# Patient Record
(demographics unavailable — no encounter records)

---

## 2024-11-29 NOTE — HISTORY OF PRESENT ILLNESS
[FreeTextEntry1] : JERED DUNNE is a 78 year old woman who presents for follow up office visit for further evaluation of joint symptoms and rheumatic  diseases including osteoarthritis, osteoporosis, fibromyalgia, and Raynaud's.  Patient feels fairly well. Low back pain radiating to right lower extremity to the foot with paresthesias. Occasional Raynauds Episodes. Denies recent sleep disturbance and fatigue. Short course prednisone with relief of right elbow pains but no change of low back pain. Patient continues calcium and vitamin D supplements and annual IV zoledronate (last dose March 18, 2024).  Patient denies rash or side effects with their medications.  Patient is content with their current medications.

## 2024-11-29 NOTE — CONSULT LETTER
[Dear  ___] : Dear  [unfilled], [Consult Letter:] : I had the pleasure of evaluating your patient, [unfilled]. [Please see my note below.] : Please see my note below. [Consult Closing:] : Thank you very much for allowing me to participate in the care of this patient.  If you have any questions, please do not hesitate to contact me. [Sincerely,] : Sincerely, [FreeTextEntry3] : Myron Myron I. Kleiner, M.D., FACR Chief, Division of Rheumatology Department of Medicine [facility] [DrSienna  ___] : Dr. EUCEDA

## 2024-11-29 NOTE — CONSULT LETTER
[Dear  ___] : Dear  [unfilled], [Consult Letter:] : I had the pleasure of evaluating your patient, [unfilled]. [Please see my note below.] : Please see my note below. [Consult Closing:] : Thank you very much for allowing me to participate in the care of this patient.  If you have any questions, please do not hesitate to contact me. [Sincerely,] : Sincerely, [FreeTextEntry3] : Myron Myron I. Kleiner, M.D., FACR Chief, Division of Rheumatology Department of Medicine [facility] [DrSienna  ___] : Dr. EUCEDA

## 2024-11-29 NOTE — ASSESSMENT
[FreeTextEntry1] : Impression: JERED DUNNE is a 78 year old woman who presents for follow up office visit for further evaluation of joint symptoms and rheumatic diseases including osteoarthritis, fibromyalgia, Raynaud's, chronic low back pain/lumbar spinal stenosis/lumbar neuroforaminal stenosis, osteoporosis with compression deformity of L4  Patient feels fairly well. Low back pain radiating to right lower extremity to the foot with paresthesias Secondary to chronic low back pain/lumbar spinal stenosis/lumbar neuroforaminal stenosis and cyst pressing on the S2 nerve root. From previous physical exam tender b/l paraspinal muscles, On exam pt has bilateral bicipital tendonitis, right lateral epicondylitis, flexor tenosynovitis bilateral first-fifth fingers contributing to her joint pain. Occasional Raynauds Episodes - on exam pt has Raynauds episode - not bothersome. Denies recent sleep disturbance and fatigue with her fibromyalgia quiescent. Short course prednisone with relief of right elbow pains but no change of low back pain. On exam pt has dry eyes and tongue moist - I will continue to monitor for Sjogren Syndrome. Recent lab tests results revealed +anticardiolipin IgM antibody, otherwise unrevealing-- with extensive discussion. Recent xray results revealed osteoarthritis LS spine, compression deformity of L4 -- with extensive discussion. 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  Patient continues prophylactic aspirin for their positive anticardiolipin antibody-- patient aware of the increased risk of thrombosis. Patient continues calcium and vitamin D supplements and annual IV zoledronate (last dose March 18, 2024) without side effect or complications for osteoporosis with compression deformity of L4. Patient denies rash or side effects with their medications.  Patient is content with their current medications.   Plan: I reviewed  chart and previous records  I reviewed previous lab results with patient--with extensive discussion X-rays results reviewed with the patient with extensive discussion I reviewed recent MRI of LS spine results with patient with extensive discussion Laboratory tests ordered today-see list below--with coordination of care Continue other current medications (other than those changed below) IV zoledronate 5 mg (Possible side effects explained including AVN of jaw)--to be authorized and performed--with coordination of care with the authorization team and nursing staff - after March 21th, 2025  Consider short course of Medrol- - pt declined  Consider NSAIDs - pt declined  Consider Muscle relaxant - pt declined  Tylenol 1000 mg t.i.d. p.r.n. or Tylenol 1300 mg b.i.d. p.r.n. (possible side effects explained) Daily exercise starting at 10 minutes per day, gradually increasing to at least 30 minutes per day--emphasized  Keep hands and feet and torso warm--patient warned of the dangers of gangrene/digital amputation with Raynaud's--extensive discussion Increase home temperature at least 72-74 degrees F--extensive discussion Artificial tears one drop each eye q.i.d. and p.r.n.(Possible side effects explained)  Biotene mouthwash/spray q.i.d. and p.r.n.(Possible side effects explained)  Oral Hydration Patient declines oral medication for dryness Consider PT - pt declined  Consider Pain Managment f/u - pt declined--she will see Spine Surgeon  Spine Surgeon f/u Dr. Frankie Shah regarding cyst found on recent MRI of LS spine and other abnormalities on MRI-- with coordination of care  -- please send me a consult report  Return visit 4 months All questions and concerns were addressed. Total time for this office visit, including face-to-face time and non-face-to-face time, 87 minutes--- including review of the chart and previous records, detailed review of her medical history, review of previous lab results with extensive discussion with the patient, ordering lab tests with coordination of care, review of recent imaging reports/x-ray results with extensive discussion with the patient, review of recent MRI LS spine results with extensive discussion,, detailed medication history, review of medications going forward with their possible side effects, ordering her next IV zoledronate infusion with extensive discussion and completion and submission of the infusion booking form and with coordination of care with the authorization team and nursing staff, referred patient for reevaluation by neurosurgery with coordination of care, reviewed protocol for prevention of Raynaud's with extensive discussion as noted above, reviewed the modalities for treatment of her dryness with extensive discussion, discussion regarding treatment with short course of Medrol which patient declined, discussion regarding starting physical therapy which patient declined, discussion regarding obtaining pain management consultation which patient declined, discussion regarding starting NSAIDs which patient declined, discussion regarding starting muscle relaxant which patient declined, reviewed the impact of the patient's rheumatic disease on their other medical problems, reviewed the impact of the patient's other medical problems on their rheumatic disease

## 2024-11-29 NOTE — ADDENDUM
[FreeTextEntry1] :  I, Ole Olmos, acted solely as a scribe for Dr. Myron I. Kleiner, MD. on 11/26/2024. I personally performed the services described in the documentation, reviewed the documentation recorded by the scribe in my presence, and it accurately and completely records my words and actions.

## 2025-03-25 NOTE — HISTORY OF PRESENT ILLNESS
[Denies] : Denies [No] : No [Yes] : Yes [Declined] : Declined [Informed consent documented in EHR.] : Informed consent documented in EHR. [Creatinine] : creatinine [Bone Density/DEXA] : Bone Density/DEXA [Left upper extremity] : Left upper extremity [24g] : 24g [Start Time: ___] : Medication Start Time: [unfilled] [End Time: ___] : Medication End Time: [unfilled] [Medication Name: ___] : Medication Name: [unfilled] [Total Amount Administered: ___] : Total Amount Administered: [unfilled] [IV discontinued. Intact. No signs or symptoms of IV complications noted. Time: ___] : IV discontinued. Intact. No signs or symptoms of IV complications noted. Time: [unfilled] [Patient  instructed to seek medical attention with signs and symptoms of adverse effects] : Patient  instructed to seek medical attention with signs and symptoms of adverse effects [Patient left unit in no acute distress] : Patient left unit in no acute distress [Medications administered as ordered and tolerated well.] : Medications administered as ordered and tolerated well. [de-identified] : [id number]

## 2025-04-03 NOTE — PHYSICAL EXAM
[General Appearance - Alert] : alert [General Appearance - Well Nourished] : well nourished [General Appearance - In No Acute Distress] : in no acute distress [General Appearance - Well Developed] : well developed [General Appearance - Well-Appearing] : healthy appearing [Sclera] : the sclera and conjunctiva were normal [PERRL With Normal Accommodation] : pupils were equal in size, round, and reactive to light [Extraocular Movements] : extraocular movements were intact [Outer Ear] : the ears and nose were normal in appearance [Oropharynx] : the oropharynx was normal [Neck Appearance] : the appearance of the neck was normal [Neck Cervical Mass (___cm)] : no neck mass was observed [Jugular Venous Distention Increased] : there was no jugular-venous distention [Thyroid Diffuse Enlargement] : the thyroid was not enlarged [Lungs Percussion] : the lungs were normal to percussion [Heart Rate And Rhythm] : heart rate was normal and rhythm regular [Edema] : there was no peripheral edema [Abdomen Soft] : soft [Abdomen Tenderness] : non-tender [Abdomen Mass (___ Cm)] : no abdominal mass palpated [Cervical Lymph Nodes Enlarged Posterior Bilaterally] : posterior cervical [Cervical Lymph Nodes Enlarged Anterior Bilaterally] : anterior cervical [Supraclavicular Lymph Nodes Enlarged Bilaterally] : supraclavicular [Axillary Lymph Nodes Enlarged Bilaterally] : axillary [No CVA Tenderness] : no ~M costovertebral angle tenderness [No Spinal Tenderness] : no spinal tenderness [Skin Color & Pigmentation] : normal skin color and pigmentation [Skin Turgor] : normal skin turgor [] : no rash [Cranial Nerves] : cranial nerves 2-12 were intact [Sensation] : the sensory exam was normal to light touch and pinprick [Motor Exam] : the motor exam was normal [No Focal Deficits] : no focal deficits [Oriented To Time, Place, And Person] : oriented to person, place, and time [Impaired Insight] : insight and judgment were intact [Affect] : the affect was normal [Mood] : the mood was normal

## 2025-04-03 NOTE — CONSULT LETTER
[Dear  ___] : Dear  [unfilled], [Consult Letter:] : I had the pleasure of evaluating your patient, [unfilled]. [Please see my note below.] : Please see my note below. [Consult Closing:] : Thank you very much for allowing me to participate in the care of this patient.  If you have any questions, please do not hesitate to contact me. [Sincerely,] : Sincerely, [DrSienna  ___] : Dr. EUCEDA

## 2025-04-03 NOTE — CONSULT LETTER
[Dear  ___] : Dear  [unfilled], [Consult Letter:] : I had the pleasure of evaluating your patient, [unfilled]. [Please see my note below.] : Please see my note below. [Consult Closing:] : Thank you very much for allowing me to participate in the care of this patient.  If you have any questions, please do not hesitate to contact me. [Sincerely,] : Sincerely, [DrSienna  ___] : Dr. EUECDA